# Patient Record
Sex: FEMALE | NOT HISPANIC OR LATINO | Employment: FULL TIME | URBAN - METROPOLITAN AREA
[De-identification: names, ages, dates, MRNs, and addresses within clinical notes are randomized per-mention and may not be internally consistent; named-entity substitution may affect disease eponyms.]

---

## 2022-11-16 ENCOUNTER — OFFICE VISIT (OUTPATIENT)
Dept: URGENT CARE | Facility: PHYSICIAN GROUP | Age: 47
End: 2022-11-16

## 2023-09-02 ENCOUNTER — HOSPITAL ENCOUNTER (EMERGENCY)
Facility: MEDICAL CENTER | Age: 48
End: 2023-09-02
Attending: EMERGENCY MEDICINE

## 2023-09-02 VITALS
RESPIRATION RATE: 16 BRPM | HEART RATE: 78 BPM | TEMPERATURE: 98.4 F | DIASTOLIC BLOOD PRESSURE: 78 MMHG | SYSTOLIC BLOOD PRESSURE: 121 MMHG | WEIGHT: 173 LBS | OXYGEN SATURATION: 98 % | BODY MASS INDEX: 32.66 KG/M2 | HEIGHT: 61 IN

## 2023-09-02 DIAGNOSIS — H92.01 RIGHT EAR PAIN: ICD-10-CM

## 2023-09-02 DIAGNOSIS — L08.9 WOUND INFECTION: ICD-10-CM

## 2023-09-02 DIAGNOSIS — T14.8XXA WOUND INFECTION: ICD-10-CM

## 2023-09-02 LAB — GLUCOSE BLD STRIP.AUTO-MCNC: 98 MG/DL (ref 65–99)

## 2023-09-02 PROCEDURE — 82962 GLUCOSE BLOOD TEST: CPT

## 2023-09-02 PROCEDURE — 99282 EMERGENCY DEPT VISIT SF MDM: CPT

## 2023-09-02 RX ORDER — CEPHALEXIN 500 MG/1
500 CAPSULE ORAL 4 TIMES DAILY
Qty: 28 CAPSULE | Refills: 0 | Status: ACTIVE | OUTPATIENT
Start: 2023-09-02 | End: 2023-09-02 | Stop reason: SDUPTHER

## 2023-09-02 RX ORDER — CEPHALEXIN 500 MG/1
500 CAPSULE ORAL 4 TIMES DAILY
Qty: 28 CAPSULE | Refills: 0 | Status: ACTIVE | OUTPATIENT
Start: 2023-09-02 | End: 2023-09-09

## 2023-09-03 NOTE — ED TRIAGE NOTES
"Chief Complaint   Patient presents with    Ear Pain     PATIENT WITH EAR PAIN / SWELLING FROM A WOUND TO THE LEFT EAR FOR ~ 1 WEEK. PT WAS GIVEN ANTIBIOTICS AND THE WOUND IS GROWING AND SWELLING AND PAIN IS INCREASING. PT STATES THIS PAIN IS ALSO CAUSING DIZZINESS.     INTERPRETOR USED: LINDA [id number]           PT AMBULATORY TO TRIAGE. Pt AA&O X 4, SEE ABOVE. PT HAS BEEN ON ANTIBIOTICS FOR 5 DAYS.     PT TO LOBBY . PT EDUCATED ON ALERTING STAFF TO CHANGES IN CONDITION. PT VERBALIZED AN UNDERSTANDING.     /84   Pulse 80   Temp 36.4 °C (97.6 °F) (Temporal)   Resp 14   Ht 1.549 m (5' 1\")   Wt 78.5 kg (173 lb)   SpO2 97%   BMI 32.69 kg/m²     "

## 2023-09-03 NOTE — ED PROVIDER NOTES
ER Provider Note    Scribed for Shanell Flynn M.d. by Zenia Devi. 9/2/2023  5:54 PM    Primary Care Provider: Pcp Pt States None    CHIEF COMPLAINT  Chief Complaint   Patient presents with    Ear Pain     PATIENT WITH EAR PAIN / SWELLING FROM A WOUND TO THE LEFT EAR FOR ~ 1 WEEK. PT WAS GIVEN ANTIBIOTICS AND THE WOUND IS GROWING AND SWELLING AND PAIN IS INCREASING. PT STATES THIS PAIN IS ALSO CAUSING DIZZINESS.     INTERPRETOR USED: LINDA [id number]     LIMITATION TO HISTORY   Select: Language Mohawk,  Used     HPI/ROS  OUTSIDE HISTORIAN(S):  None    EXTERNAL RECORDS REVIEWED  Inpatient Notes No records to review      Joy Goldman is a 48 y.o. female who presents to the ED for worsening right ear pain due to a worsening sore of her tragus onset one week ago. Patient reports that lesions started as a scratch/abrasion 1 weeks ago and over the course of the last few days has started to crust over and enlarge and become more painful. She denies insect bite or any new piercings. Patient went to [facility] on Tuesday where she was given Bactrim. However, the wound has increased in size with more swelling and pain, especially at night when she is sleeping.  She uses Tylenol for the pain with mild alleviation.  Denies fever, chills, headache, ear pain other than at the tragus, neck pain, or vomiting. No history of diabetes.        PAST MEDICAL HISTORY  Past Medical History:   Diagnosis Date    Patient denies medical problems        SURGICAL HISTORY  History reviewed. No pertinent surgical history.    FAMILY HISTORY  History reviewed. No pertinent family history.    SOCIAL HISTORY   reports that she has never smoked. She has never used smokeless tobacco. She reports that she does not drink alcohol and does not use drugs.    CURRENT MEDICATIONS  None noted.     ALLERGIES  Patient has no known allergies.    PHYSICAL EXAM  /84   Pulse 80   Temp 36.4 °C (97.6 °F) (Temporal)   Resp 14   Ht 1.549 m (5'  "1\")   Wt 78.5 kg (173 lb)   SpO2 97%   BMI 32.69 kg/m²     Constitutional:, Alert in no apparent distress.  HENT: Normocephalic, Bilateral external ears normal. Nose normal.  There is a 1 cm oval-shaped crusty lesion to the anterior aspect of the right tragus.  There is pain with palpation of the tragus.  No pain with movement of the pinna.  No swelling of the pinna.  No erythema to the pinna.  No surrounding erythema to the tragus.  There is some mild tenderness to the preauricular lymph nodes on the right with some slight enlargement of the preauricular nodes on that side.  No tenderness at the TMJ.  Patient able to open and close her mouth fully without any discomfort.  No tenderness of the neck.  No significant cervical adenopathy.  No tenderness to the mastoid.  No drainage or caseous material within the right ear canal.  TMs are normal bilaterally.  Eyes: Pupils are equal and reactive. Conjunctiva normal, non-icteric.   Thorax & Lungs: Easy unlabored respirations  Skin: Visualized skin is  Dry, No erythema, No rash.   Extremities:   FROM to all extremities  Neurologic: Alert, clear speech.   Psychiatric: Affect and Mood normal      COURSE & MEDICAL DECISION MAKING    ED Observation Status? No; Patient does not meet criteria for ED Observation.     INITIAL ASSESSMENT AND PLAN  Care Narrative: Patient has a crusty painful lesion to the anterior tragus of the right ear.  It started as a small abrasion about a week ago and has progressively developed into a 1 cm oval-shaped crusty lesion which is tender and painful.  The pinna is otherwise normal without swelling, tenderness or lesion.  There is some tenderness to the preauricular area in the area of some enlarged preauricular lymph nodes.  No mastoid tenderness.  The patient has not had fevers or chills.  She is afebrile here in the ER.  She is not septic or toxic.  No tenderness at the TMJ.  No headache.  No stiff neck.  No signs of meningitis.  Patient was " seen by her primary care physician at the [facility] 4 days ago and was placed on Bactrim.  With the overlying crusting to the lesion, I am concerned there may be staph and the Bactrim may not be covering it.  I will place the patient on Keflex in addition to her Bactrim and also write her prescription for Bactroban.  She has been given referral to ENT and that referrals been placed in the computer system.  At this time I think she is safe and stable for outpatient management discharge home.  She has been asked to follow-up with the [facility] clinic and with ENT if symptoms do not improve in the next several days on the enhanced antibiotic regimen.  Patient gets worse in any way she should return to the ER immediately.  She has been given strict return precautions and discharge instructions and she understands treatment plan and follow-up.      5:54 PM - Patient seen and evaluated at bedside. Joy Goldman is a 48 y.o. female who presents with ear pain. Ordered POC blood glucose manual to evaluate. She understands and agrees to the plan of care.     6:54 PM - Per RN, the POC blood glucose was 98.     ADDITIONAL PROBLEM LIST AND DISPOSITION  Problem #1: worsening sore to right tragus               DISPOSITION AND DISCUSSIONS  I have discussed management of the patient with the following physicians and PAULY's: none    Discussion of management with other QHP or appropriate source(s): none     Escalation of care considered, and ultimately not performed: blood analysis. Pt does not have fever, is not diabetic, has normal accu check here in ER today, and is not septic or toxic in appearance. No evidence of cellulitis. No need for blood work at this time.    Barriers to care at this time, including but not limited to: none known     Decision tools and prescription drugs considered including, but not limited to: Antibiotics Will add keflex and bactroban to the bactrim that patient is already taking to cover for staph  .    Pt declines narcotic RX    DISPOSITION:  Patient will be discharged home in stable condition.    FOLLOW UP:  [facility]  [address] [facility] [address]  [phone]        Veronica Tran M.D.  [address]  [place] [address]  [phone]    Schedule an appointment as soon as possible for a visit in 2 days  If symptoms worsen return to ER      OUTPATIENT MEDICATIONS:  Discharge Medication List as of 9/2/2023  7:54 PM          FINAL IMPRESSION   1. Wound infection Acute   2. Right ear pain Acute       This dictation has been created using voice recognition software. The accuracy of the dictation is limited by the abilities of the software. I expect there may be some errors of grammar and possibly content. I made every attempt to manually correct the errors within my dictation. However, errors related to voice recognition software may still exist and should be interpreted within the appropriate context.     Zenia MCCLELLAN (Scribe), am scribing for, and in the presence of, Shanell Flynn M.D..    Electronically signed by: Zenia Devi (Scribe), 9/2/2023    Shanell MCCLELLAN M.D. personally performed the services described in this documentation, as scribed by Zenia Devi in my presence, and it is both accurate and complete.    The note accurately reflects work and decisions made by me.  Shanell Flynn M.D.  9/3/2023  12:29 PM

## 2023-09-03 NOTE — ED NOTES
Taken patient from triage waiting room, ambulatory with steady gait, alert/ oriented x 4.Verified patient identification.  Assumed patient care.   Placed on patient room. Changed clothes to hospital gown. Connected to cardiac monitor.   Given the call light and instructed to call for any assistance needed/ or concerns.   Bed on lowest position, side rails up, breaks locked. Awaiting for ERP.

## 2023-09-03 NOTE — DISCHARGE INSTRUCTIONS
Use warm compresses on the ear.  This will help with swelling and pain.    Take 600 mg of Advil +1000 mg of Tylenol (over-the-counter medications) every 8 hours to help with the pain.    Follow-up with your doctors at the [facility] clinic on Tuesday.    Return to the ER for any worsening ear pain, for swelling around the ear or down along the face, for fevers over 100.4, shaking chills, nausea, vomiting, increased size of the wound, drainage of pus from the wound, redness around the wound or on the face, headaches, stiff neck, or for any concerns.    Continue to take the Bactrim that you are prescribed at the [facility].  However, also add the Keflex and the Bactroban ointment which were prescribed to you today.

## 2025-05-19 ENCOUNTER — HOSPITAL ENCOUNTER (OUTPATIENT)
Dept: RADIOLOGY | Facility: MEDICAL CENTER | Age: 50
End: 2025-05-19
Payer: OTHER GOVERNMENT

## 2025-05-20 ENCOUNTER — HOSPITAL ENCOUNTER (OUTPATIENT)
Dept: RADIOLOGY | Facility: MEDICAL CENTER | Age: 50
End: 2025-05-20
Attending: INTERNAL MEDICINE
Payer: OTHER GOVERNMENT

## 2025-05-20 DIAGNOSIS — Z12.31 VISIT FOR SCREENING MAMMOGRAM: ICD-10-CM

## 2025-05-20 PROCEDURE — 77067 SCR MAMMO BI INCL CAD: CPT

## 2025-07-30 ENCOUNTER — HOSPITAL ENCOUNTER (OUTPATIENT)
Dept: RADIOLOGY | Facility: MEDICAL CENTER | Age: 50
End: 2025-07-30
Attending: NURSE PRACTITIONER

## 2025-07-30 DIAGNOSIS — T83.31XA BREAKDOWN (MECHANICAL) OF INTRAUTERINE CONTRACEPTIVE DEVICE, INITIAL ENCOUNTER: ICD-10-CM
